# Patient Record
Sex: FEMALE | Race: AMERICAN INDIAN OR ALASKA NATIVE
[De-identification: names, ages, dates, MRNs, and addresses within clinical notes are randomized per-mention and may not be internally consistent; named-entity substitution may affect disease eponyms.]

---

## 2022-09-03 ENCOUNTER — HOSPITAL ENCOUNTER (EMERGENCY)
Dept: HOSPITAL 5 - ED | Age: 22
Discharge: LEFT BEFORE DISCHARGE | End: 2022-09-03
Payer: MEDICAID

## 2022-09-03 DIAGNOSIS — Z53.21: ICD-10-CM

## 2022-09-03 DIAGNOSIS — R10.9: Primary | ICD-10-CM

## 2022-09-03 DIAGNOSIS — R06.02: ICD-10-CM

## 2022-09-03 LAB
BASOPHILS # (AUTO): 0.1 K/MM3 (ref 0–0.1)
BASOPHILS NFR BLD AUTO: 0.8 % (ref 0–1.8)
BUN SERPL-MCNC: 14 MG/DL (ref 7–17)
BUN/CREAT SERPL: 23 %
CALCIUM SERPL-MCNC: 9.2 MG/DL (ref 8.4–10.2)
EOSINOPHIL # BLD AUTO: 0 K/MM3 (ref 0–0.4)
EOSINOPHIL NFR BLD AUTO: 0.4 % (ref 0–4.3)
HCT VFR BLD CALC: 37.1 % (ref 30.3–42.9)
HEMOLYSIS INDEX: 5
HGB BLD-MCNC: 12.8 GM/DL (ref 10.1–14.3)
LYMPHOCYTES # BLD AUTO: 2 K/MM3 (ref 1.2–5.4)
LYMPHOCYTES NFR BLD AUTO: 24.8 % (ref 13.4–35)
MCHC RBC AUTO-ENTMCNC: 35 % (ref 30–34)
MCV RBC AUTO: 81 FL (ref 79–97)
MONOCYTES # (AUTO): 0.5 K/MM3 (ref 0–0.8)
MONOCYTES % (AUTO): 6.1 % (ref 0–7.3)
PLATELET # BLD: 399 K/MM3 (ref 140–440)
RBC # BLD AUTO: 4.59 M/MM3 (ref 3.65–5.03)

## 2022-09-03 PROCEDURE — 36415 COLL VENOUS BLD VENIPUNCTURE: CPT

## 2022-09-03 PROCEDURE — 84484 ASSAY OF TROPONIN QUANT: CPT

## 2022-09-03 PROCEDURE — 93005 ELECTROCARDIOGRAM TRACING: CPT

## 2022-09-03 PROCEDURE — 84703 CHORIONIC GONADOTROPIN ASSAY: CPT

## 2022-09-03 PROCEDURE — 74022 RADEX COMPL AQT ABD SERIES: CPT

## 2022-09-03 PROCEDURE — 85025 COMPLETE CBC W/AUTO DIFF WBC: CPT

## 2022-09-03 PROCEDURE — 80048 BASIC METABOLIC PNL TOTAL CA: CPT

## 2022-09-03 NOTE — XRAY REPORT
ABDOMEN 3 VIEW(S)



INDICATION:

Abdominal Pain.



COMPARISON: 

None available.



FINDINGS:

Bowel gas pattern: No significant abnormality.

Free air: None seen.

Stones: None seen.

Chest: No acute findings.



Additional Findings: No additional significant findings.



IMPRESSION:

1. No acute findings.



Signer Name: Oumar Bridges MD 

Signed: 9/3/2022 4:17 AM

Workstation Name: [id number]

## 2022-09-05 NOTE — ELECTROCARDIOGRAPH REPORT
[facility]

                                       

Test Date:    2022               Test Time:    03:51:44

Pat Name:     CHARLETTE LINDA              Department:   

Patient ID:   [id number]          Room:          

Gender:       F                        Technician:   DEBBY

:          2000               Requested By: DEANDRE ROBBINS

Order Number: [id number]             Reading MD:   Veronica Trujillo

                                 Measurements

Intervals                              Axis          

Rate:         113                      P:            46

IL:           152                      QRS:          55

QRSD:         81                       T:            -38

QT:           360                                    

QTc:          494                                    

                           Interpretive Statements

Sinus tachycardia

Probable left atrial enlargement

No previous ECG available for comparison

Electronically Signed On 2022 16:47:08 EDT by Veronica Trujillo